# Patient Record
Sex: MALE | Race: OTHER | URBAN - METROPOLITAN AREA
[De-identification: names, ages, dates, MRNs, and addresses within clinical notes are randomized per-mention and may not be internally consistent; named-entity substitution may affect disease eponyms.]

---

## 2023-10-19 ENCOUNTER — OFFICE VISIT (OUTPATIENT)
Dept: OCCUPATIONAL MEDICINE | Age: 18
End: 2023-10-19

## 2023-10-19 VITALS
RESPIRATION RATE: 16 BRPM | DIASTOLIC BLOOD PRESSURE: 80 MMHG | TEMPERATURE: 98.8 F | OXYGEN SATURATION: 98 % | SYSTOLIC BLOOD PRESSURE: 112 MMHG | HEART RATE: 96 BPM

## 2023-10-19 DIAGNOSIS — L40.9 PSORIASIS: ICD-10-CM

## 2023-10-19 DIAGNOSIS — J06.9 VIRAL URI: Primary | ICD-10-CM

## 2023-10-19 DIAGNOSIS — J02.9 PHARYNGITIS, UNSPECIFIED ETIOLOGY: ICD-10-CM

## 2023-10-19 DIAGNOSIS — R52 GENERALIZED BODY ACHES: ICD-10-CM

## 2023-10-19 DIAGNOSIS — R51.9 ACUTE NONINTRACTABLE HEADACHE, UNSPECIFIED HEADACHE TYPE: ICD-10-CM

## 2023-10-19 LAB
GROUP A STREP ANTIGEN, POC: NEGATIVE
INFLUENZA A ANTIGEN, POC: NEGATIVE
INFLUENZA B ANTIGEN, POC: NEGATIVE
VALID INTERNAL CONTROL, POC: YES
VALID INTERNAL CONTROL, POC: YES

## 2023-10-19 RX ORDER — TRIAMCINOLONE ACETONIDE 1 MG/G
CREAM TOPICAL
Qty: 80 G | Refills: 0 | Status: SHIPPED | OUTPATIENT
Start: 2023-10-19

## 2023-10-19 NOTE — PROGRESS NOTES
PROGRESS NOTE    SUBJECTIVE:   Tona Krishnamurthy is a 19 y.o. male seen for ____. Chief Complaint    Generalized Body Aches         Generalized Body Aches  This is a new problem. The current episode started yesterday. The problem has been gradually worsening. Associated symptoms include congestion, coughing, fatigue, headaches, myalgias, a rash and a sore throat. Pertinent negatives include no abdominal pain, chest pain, chills, fever, nausea, neck pain, vertigo, visual change or weakness. He has tried nothing for the symptoms. Psoriasis  Patient reports a history of psoriasis and using Aveeno lotion, but it is not helping. It is on his forehead, both arms, and both legs. He is requesting advise on any other treatments. Minimal itching occasionally. There areas affected have dry and flaky skin  Denies- tenderness, blistering, fever, drainage    Current Outpatient Medications   Medication Sig Dispense Refill    triamcinolone (KENALOG) 0.1 % cream Apply topically 2 times daily. 80 g 0     No current facility-administered medications for this visit. No Known Allergies    Social History     Tobacco Use    Smoking status: Never    Smokeless tobacco: Current    Tobacco comments:     Vape   Substance Use Topics    Alcohol use: Not Currently        Review of Systems   Constitutional:  Positive for fatigue. Negative for chills and fever. HENT:  Positive for congestion, postnasal drip, rhinorrhea, sinus pressure, sneezing and sore throat. Negative for ear pain, sinus pain and trouble swallowing. Respiratory:  Positive for cough. Negative for shortness of breath, wheezing and stridor. Cardiovascular:  Negative for chest pain. Gastrointestinal:  Negative for abdominal pain and nausea. Musculoskeletal:  Positive for myalgias. Negative for neck pain. Skin:  Positive for rash. Psoriasis on forehead, arms and legs   Neurological:  Positive for headaches.  Negative for dizziness, vertigo,